# Patient Record
Sex: FEMALE | Race: WHITE
[De-identification: names, ages, dates, MRNs, and addresses within clinical notes are randomized per-mention and may not be internally consistent; named-entity substitution may affect disease eponyms.]

---

## 2017-03-08 ENCOUNTER — HOSPITAL ENCOUNTER (EMERGENCY)
Dept: HOSPITAL 80 - CED | Age: 47
Discharge: HOME | End: 2017-03-08
Payer: COMMERCIAL

## 2017-03-08 VITALS
SYSTOLIC BLOOD PRESSURE: 124 MMHG | RESPIRATION RATE: 18 BRPM | OXYGEN SATURATION: 94 % | DIASTOLIC BLOOD PRESSURE: 62 MMHG | HEART RATE: 85 BPM

## 2017-03-08 VITALS — TEMPERATURE: 98 F

## 2017-03-08 DIAGNOSIS — R09.1: ICD-10-CM

## 2017-03-08 DIAGNOSIS — Z86.711: ICD-10-CM

## 2017-03-08 DIAGNOSIS — J40: Primary | ICD-10-CM

## 2017-03-08 DIAGNOSIS — Z86.718: ICD-10-CM

## 2017-03-08 LAB
% IMMATURE GRANULYOCYTES: 0.3 % (ref 0–1.1)
ABSOLUTE IMMATURE GRANULOCYTES: 0.03 10^3/UL (ref 0–0.1)
ABSOLUTE NRBC COUNT: 0 10^3/UL (ref 0–0.01)
ADD DIFF?: NO
ADD MORPH?: NO
ADD SCAN?: NO
ALBUMIN SERPL-MCNC: 4.3 G/DL (ref 3.5–5)
ALP SERPL-CCNC: 62 IU/L (ref 38–126)
ALT SERPL-CCNC: 31 IU/L (ref 9–52)
ANION GAP SERPL CALC-SCNC: 15 MEQ/L (ref 8–16)
APTT BLD: 31 SEC (ref 23–38)
AST SERPL-CCNC: 19 IU/L (ref 14–46)
ATYPICAL LYMPHOCYTE FLAG: 0 (ref 0–99)
BILIRUB SERPL-MCNC: 0.6 MG/DL (ref 0.1–1.4)
CALCIUM SERPL-MCNC: 9.2 MG/DL (ref 8.5–10.4)
CHLORIDE SERPL-SCNC: 104 MEQ/L (ref 97–110)
CO2 SERPL-SCNC: 21 MEQ/L (ref 22–31)
CREAT SERPL-MCNC: 0.7 MG/DL (ref 0.6–1)
ERYTHROCYTE [DISTWIDTH] IN BLOOD BY AUTOMATED COUNT: 11.6 % (ref 11.5–15.2)
FRAGMENT RBC FLAG: 0 (ref 0–99)
GFR SERPL CREATININE-BSD FRML MDRD: > 60 ML/MIN/{1.73_M2}
GLUCOSE SERPL-MCNC: 155 MG/DL (ref 70–100)
HCT VFR BLD CALC: 42.8 % (ref 38–47)
HGB BLD-MCNC: 14.5 G/DL (ref 12.6–16.3)
INR PPP: 1.05 (ref 0.83–1.16)
LEFT SHIFT FLG: 0 (ref 0–99)
LIPEMIA HEMOLYSIS FLAG: 90 (ref 0–99)
MCH RBC BLDCO QN: 30.3 PG (ref 27.9–34.1)
MCHC RBC AUTO-ENTMCNC: 33.9 G/DL (ref 32.4–36.7)
MCV RBC AUTO: 89.4 FL (ref 81.5–99.8)
NRBC-AUTO%: 0 % (ref 0–0.2)
PLATELET # BLD: 181 10^3/UL (ref 150–400)
PLATELET CLUMPS FLAG: 0 (ref 0–99)
PMV BLD AUTO: 11.2 FL (ref 8.7–11.7)
POTASSIUM SERPL-SCNC: 3.8 MEQ/L (ref 3.5–5.2)
PROT SERPL-MCNC: 7.3 G/DL (ref 6.3–8.2)
PROTHROMBIN TIME: 13.4 SEC (ref 12–15)
RBC # BLD AUTO: 4.79 10^6/UL (ref 4.18–5.33)
SODIUM SERPL-SCNC: 140 MEQ/L (ref 134–144)

## 2017-03-08 NOTE — UCPHY
H & P


Patient Type: Established


Chief Complaint Nursing Narrative: c/o Cough/fevers/body aches x 24 hrs- last 

took tyl @ 645 100mg


Time Seen by Provider: 03/08/17 08:25


HPI/ROS: 





46-year-old female presents complaining of cough fever and lung pain, pain with 

inspiration for approximately 24 hours.


She feels like she may be coming down with the flu.  She is also concerned 

because she has a history of pulmonary embolus, an iliac stent and is concerned 

about the possibly of pulmonary embolism recurrence.








Review of systems


As per HPI


General positive fever positive chills no weakness


HEENT no eye pain no eye discharge. No eye redness, no sore throat


Respiratory positive cough, no shortness of breath


Cardiac no chest pain, no peripheral edema, pleuritic chest pain


GI no abdominal pain, no diarrhea, no constipation, no nausea, no vomiting


  no flank pain, no hematuria, no dysuria


Musculoskeletal no myalgias, no joint pain


Heme  no easy bruising, no easy bleeding


Endo no polyuria, no polydipsia


Skin no rashes, no pruritus


Neuro no syncope, no dizziness, no headaches


Psych is no suicidal ideation, no homicidal ideation





Source: Patient


Exam Limitations: No limitations





- Personal History


LMP (Females 10-55): Unknown


Current Tetanus/Diphtheria Vaccine: Yes


Current Tetanus Diphtheria and Acellular Pertussis (TDAP): Yes


Tetanus Vaccine Date: <10 YRS





- Medical/Surgical History


Hx Asthma: No


Hx Chronic Respiratory Disease: No


Hx Diabetes: No


Hx Cardiac Disease: No


Hx Renal Disease: No


Hx Cirrhosis: No


Hx Alcoholism: No


Hx HIV/AIDS: No


Hx Splenectomy or Spleen Trauma: No


Other PMH: DVTs, PE, Iliac Stent, ortho surg





- Family History


Significant Family History: No pertinent family hx





- Social History


Smoking Status: Never smoked


Alcohol Use: Occasionally


Drug Use: None





- Physical Exam


Exam: 





46-year-old female alert and oriented no acute distress nontoxic appearance 

afebrile


HEENT atraumatic normocephalic, extraocular muscles intact, anicteric


Oropharynx negative for erythema negative exudate, tolerating her own secretions


Neck supple no meningismus


Lungs clear to auscultation bilaterally


Heart regular rate and rhythm without murmur rub or gallop


Abdomen nondistended normoactive bowel sounds soft nontender


Back no CVA tenderness, no step-offs, no spinal tenderness


Extremities no cyanosis clubbing or edema


Neuro alert and oriented, no focal deficits


Constitutional: 


 Initial Vital Signs











Temperature (C)  36.6 C   03/08/17 08:25


 


Heart Rate  78   03/08/17 08:25


 


Respiratory Rate  20   03/08/17 08:25


 


Blood Pressure  118/62   03/08/17 08:25


 


O2 Sat (%)  97   03/08/17 08:25








 











O2 Delivery Mode               Room Air














Allergies/Adverse Reactions: 


 





No Known Allergies Allergy (Unverified 09/30/16 10:19)


 








Home Medications: 














 Medication  Instructions  Recorded


 


Aspirin  09/30/16














Medical Decision Making


ED Course/Re-evaluation: 





Patient seen and evaluated for pleuritic chest pain, cough, fevers chills





Influenza negative


Chest x-ray consistent with bronchitis


IV established in lab sent to evaluate for possible pulmonary embolism


Labs within normal limits


CT chest negative for pulmonary embolism








Differential diagnosis considered


Bronchitis, pneumonia, pulmonary embolism, pleurisy, URI, costochondritis





Impression


Bronchitis


Pleurisy





Plan


Symptomatic care


Follow up with PCP





- Data Points


Laboratory Results: 


 Laboratory Results





 03/08/17 09:10 





 03/08/17 09:10 





 











  03/08/17 03/08/17 03/08/17





  09:10 09:10 09:10


 


WBC      8.67 10^3/uL 10^3/uL





     (3.80-9.50) 


 


RBC      4.79 10^6/uL 10^6/uL





     (4.18-5.33) 


 


Hgb      14.5 g/dL g/dL





     (12.6-16.3) 


 


Hct      42.8 % %





     (38.0-47.0) 


 


MCV      89.4 fL fL





     (81.5-99.8) 


 


MCH      30.3 pg pg





     (27.9-34.1) 


 


MCHC      33.9 g/dL g/dL





     (32.4-36.7) 


 


RDW      11.6 % %





     (11.5-15.2) 


 


Plt Count      181 10^3/uL 10^3/uL





     (150-400) 


 


MPV      11.2 fL fL





     (8.7-11.7) 


 


Neut % (Auto)      91.3 % H %





     (39.3-74.2) 


 


Lymph % (Auto)      3.9 % L %





     (15.0-45.0) 


 


Mono % (Auto)      4.0 % L %





     (4.5-13.0) 


 


Eos % (Auto)      0.3 % L %





     (0.6-7.6) 


 


Baso % (Auto)      0.2 % L %





     (0.3-1.7) 


 


Nucleat RBC Rel Count      0.0 % %





     (0.0-0.2) 


 


Absolute Neuts (auto)      7.90 10^3/uL H 10^3/uL





     (1.70-6.50) 


 


Absolute Lymphs (auto)      0.34 10^3/uL L 10^3/uL





     (1.00-3.00) 


 


Absolute Monos (auto)      0.35 10^3/uL 10^3/uL





     (0.30-0.80) 


 


Absolute Eos (auto)      0.03 10^3/uL 10^3/uL





     (0.03-0.40) 


 


Absolute Basos (auto)      0.02 10^3/uL 10^3/uL





     (0.02-0.10) 


 


Absolute Nucleated RBC      0.00 10^3/uL 10^3/uL





     (0-0.01) 


 


Immature Gran %      0.3 % %





     (0.0-1.1) 


 


Immature Gran #      0.03 10^3/uL 10^3/uL





     (0.00-0.10) 


 


PT    13.4 SEC SEC  





    (12.0-15.0)  


 


INR    1.05   





    (0.83-1.16)  


 


APTT    31.0 SEC SEC  





    (23.0-38.0)  


 


D-Dimer    < 0.27 ug/mLFEU ug/mLFEU  





    (0.00-0.50)  


 


Sodium  140 mEq/L mEq/L    





   (134-144)   


 


Potassium  3.8 mEq/L mEq/L    





   (3.5-5.2)   


 


Chloride  104 mEq/L mEq/L    





   ()   


 


Carbon Dioxide  21 mEq/l L mEq/l    





   (22-31)   


 


Anion Gap  15 mEq/L mEq/L    





   (8-16)   


 


BUN  12 mg/dL mg/dL    





   (7-23)   


 


Creatinine  0.7 mg/dL mg/dL    





   (0.6-1.0)   


 


Estimated GFR  > 60     





    


 


Glucose  155 mg/dL H mg/dL    





   ()   


 


Calcium  9.2 mg/dL mg/dL    





   (8.5-10.4)   


 


Total Bilirubin  0.6 mg/dL mg/dL    





   (0.1-1.4)   


 


AST  19 IU/L IU/L    





   (14-46)   


 


ALT  31 IU/L IU/L    





   (9-52)   


 


Alkaline Phosphatase  62 IU/L IU/L    





   ()   


 


Total Protein  7.3 g/dL g/dL    





   (6.3-8.2)   


 


Albumin  4.3 g/dL g/dL    





   (3.5-5.0)   


 


Influenza Typ A,B (DFA)      





    














  03/08/17





  08:45


 


WBC  





  


 


RBC  





  


 


Hgb  





  


 


Hct  





  


 


MCV  





  


 


MCH  





  


 


MCHC  





  


 


RDW  





  


 


Plt Count  





  


 


MPV  





  


 


Neut % (Auto)  





  


 


Lymph % (Auto)  





  


 


Mono % (Auto)  





  


 


Eos % (Auto)  





  


 


Baso % (Auto)  





  


 


Nucleat RBC Rel Count  





  


 


Absolute Neuts (auto)  





  


 


Absolute Lymphs (auto)  





  


 


Absolute Monos (auto)  





  


 


Absolute Eos (auto)  





  


 


Absolute Basos (auto)  





  


 


Absolute Nucleated RBC  





  


 


Immature Gran %  





  


 


Immature Gran #  





  


 


PT  





  


 


INR  





  


 


APTT  





  


 


D-Dimer  





  


 


Sodium  





  


 


Potassium  





  


 


Chloride  





  


 


Carbon Dioxide  





  


 


Anion Gap  





  


 


BUN  





  


 


Creatinine  





  


 


Estimated GFR  





  


 


Glucose  





  


 


Calcium  





  


 


Total Bilirubin  





  


 


AST  





  


 


ALT  





  


 


Alkaline Phosphatase  





  


 


Total Protein  





  


 


Albumin  





  


 


Influenza Typ A,B (DFA)  NEGATIVE FOR FLU 





   (NEGATIVE) 














Departure





- Departure


Disposition: Home, Routine, Self-Care


Clinical Impression: 


 Bronchitis, Pleurisy





Condition: Good


Instructions:  Pleurisy (ED), Acute Bronchitis (ED)


Referrals: 


Josefina Navas MD [Primary Care Provider] - As per Instructions





- PQRS


PQRS Measurement: 





na

## 2017-05-20 ENCOUNTER — HOSPITAL ENCOUNTER (OUTPATIENT)
Dept: HOSPITAL 80 - FIMAGING | Age: 47
End: 2017-05-20
Attending: FAMILY MEDICINE
Payer: COMMERCIAL

## 2017-05-20 DIAGNOSIS — K76.9: Primary | ICD-10-CM

## 2017-05-20 PROCEDURE — A9585 GADOBUTROL INJECTION: HCPCS

## 2017-06-08 ENCOUNTER — HOSPITAL ENCOUNTER (OUTPATIENT)
Dept: HOSPITAL 80 - FIMAGING | Age: 47
End: 2017-06-08
Attending: FAMILY MEDICINE
Payer: COMMERCIAL

## 2017-06-08 DIAGNOSIS — K76.89: Primary | ICD-10-CM

## 2017-06-08 PROCEDURE — A9581 GADOXETATE DISODIUM INJ: HCPCS

## 2018-01-26 ENCOUNTER — HOSPITAL ENCOUNTER (OUTPATIENT)
Dept: HOSPITAL 80 - CIMAGING | Age: 48
End: 2018-01-26
Attending: FAMILY MEDICINE
Payer: COMMERCIAL

## 2018-01-26 DIAGNOSIS — R07.9: Primary | ICD-10-CM

## 2018-10-09 ENCOUNTER — HOSPITAL ENCOUNTER (OUTPATIENT)
Dept: HOSPITAL 80 - CIMAGING | Age: 48
End: 2018-10-09
Attending: FAMILY MEDICINE
Payer: COMMERCIAL

## 2018-10-09 DIAGNOSIS — M25.572: Primary | ICD-10-CM
